# Patient Record
Sex: MALE | Race: AMERICAN INDIAN OR ALASKA NATIVE
[De-identification: names, ages, dates, MRNs, and addresses within clinical notes are randomized per-mention and may not be internally consistent; named-entity substitution may affect disease eponyms.]

---

## 2019-03-13 ENCOUNTER — HOSPITAL ENCOUNTER (INPATIENT)
Dept: HOSPITAL 5 - ED | Age: 29
LOS: 2 days | Discharge: LEFT BEFORE DISCHARGE | DRG: 605 | End: 2019-03-15
Attending: INTERNAL MEDICINE | Admitting: INTERNAL MEDICINE
Payer: SELF-PAY

## 2019-03-13 DIAGNOSIS — F17.210: ICD-10-CM

## 2019-03-13 DIAGNOSIS — F12.90: ICD-10-CM

## 2019-03-13 DIAGNOSIS — B19.20: ICD-10-CM

## 2019-03-13 DIAGNOSIS — F14.90: ICD-10-CM
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
## 2019-03-13 DIAGNOSIS — K00.7: ICD-10-CM

## 2019-03-13 DIAGNOSIS — Z72.89: ICD-10-CM

## 2019-03-13 DIAGNOSIS — S40.851A: Primary | ICD-10-CM

## 2019-03-13 DIAGNOSIS — R07.9: ICD-10-CM

## 2019-03-13 DIAGNOSIS — F19.939: ICD-10-CM

## 2019-03-13 LAB
ALBUMIN SERPL-MCNC: 3.9 G/DL (ref 3.9–5)
ALT SERPL-CCNC: 150 UNITS/L (ref 7–56)
BASOPHILS # (AUTO): 0.1 K/MM3 (ref 0–0.1)
BASOPHILS NFR BLD AUTO: 0.8 % (ref 0–1.8)
BENZODIAZEPINES SCREEN,URINE: (no result)
BILIRUB UR QL STRIP: (no result)
BLOOD UR QL VISUAL: (no result)
BUN SERPL-MCNC: 12 MG/DL (ref 9–20)
BUN/CREAT SERPL: 20 %
CALCIUM SERPL-MCNC: 9.2 MG/DL (ref 8.4–10.2)
EOSINOPHIL # BLD AUTO: 0.1 K/MM3 (ref 0–0.4)
EOSINOPHIL NFR BLD AUTO: 0.7 % (ref 0–4.3)
HCT VFR BLD CALC: 39.9 % (ref 35.5–45.6)
HEMOLYSIS INDEX: 7
HGB BLD-MCNC: 13.4 GM/DL (ref 11.8–15.2)
LYMPHOCYTES # BLD AUTO: 2.3 K/MM3 (ref 1.2–5.4)
LYMPHOCYTES NFR BLD AUTO: 27.8 % (ref 13.4–35)
MCHC RBC AUTO-ENTMCNC: 34 % (ref 32–34)
MCV RBC AUTO: 86 FL (ref 84–94)
METHADONE SCREEN,URINE: (no result)
MONOCYTES # (AUTO): 1 K/MM3 (ref 0–0.8)
MONOCYTES % (AUTO): 12.1 % (ref 0–7.3)
OPIATE SCREEN,URINE: (no result)
PH UR STRIP: 7 [PH] (ref 5–7)
PLATELET # BLD: 342 K/MM3 (ref 140–440)
PROT UR STRIP-MCNC: (no result) MG/DL
RBC # BLD AUTO: 4.62 M/MM3 (ref 3.65–5.03)
RBC #/AREA URNS HPF: 2 /HPF (ref 0–6)
UROBILINOGEN UR-MCNC: 4 MG/DL (ref ?–2)
WBC #/AREA URNS HPF: < 1 /HPF (ref 0–6)

## 2019-03-13 PROCEDURE — 90715 TDAP VACCINE 7 YRS/> IM: CPT

## 2019-03-13 PROCEDURE — 93010 ELECTROCARDIOGRAM REPORT: CPT

## 2019-03-13 PROCEDURE — 82550 ASSAY OF CK (CPK): CPT

## 2019-03-13 PROCEDURE — 93005 ELECTROCARDIOGRAM TRACING: CPT

## 2019-03-13 PROCEDURE — 87040 BLOOD CULTURE FOR BACTERIA: CPT

## 2019-03-13 PROCEDURE — 80048 BASIC METABOLIC PNL TOTAL CA: CPT

## 2019-03-13 PROCEDURE — 87116 MYCOBACTERIA CULTURE: CPT

## 2019-03-13 PROCEDURE — 36415 COLL VENOUS BLD VENIPUNCTURE: CPT

## 2019-03-13 PROCEDURE — 81001 URINALYSIS AUTO W/SCOPE: CPT

## 2019-03-13 PROCEDURE — 93306 TTE W/DOPPLER COMPLETE: CPT

## 2019-03-13 PROCEDURE — 80307 DRUG TEST PRSMV CHEM ANLYZR: CPT

## 2019-03-13 PROCEDURE — 80053 COMPREHEN METABOLIC PANEL: CPT

## 2019-03-13 PROCEDURE — 76937 US GUIDE VASCULAR ACCESS: CPT

## 2019-03-13 PROCEDURE — 82553 CREATINE MB FRACTION: CPT

## 2019-03-13 PROCEDURE — 0JJV0ZZ INSPECTION OF UPPER EXTREMITY SUBCUTANEOUS TISSUE AND FASCIA, OPEN APPROACH: ICD-10-PCS | Performed by: INTERNAL MEDICINE

## 2019-03-13 PROCEDURE — 80074 ACUTE HEPATITIS PANEL: CPT

## 2019-03-13 PROCEDURE — 87186 SC STD MICRODIL/AGAR DIL: CPT

## 2019-03-13 PROCEDURE — 99406 BEHAV CHNG SMOKING 3-10 MIN: CPT

## 2019-03-13 PROCEDURE — 82140 ASSAY OF AMMONIA: CPT

## 2019-03-13 PROCEDURE — 85025 COMPLETE CBC W/AUTO DIFF WBC: CPT

## 2019-03-13 PROCEDURE — 84484 ASSAY OF TROPONIN QUANT: CPT

## 2019-03-13 PROCEDURE — 10121 INC&RMVL FB SUBQ TISS COMP: CPT

## 2019-03-13 RX ADMIN — NICOTINE SCH MG: 21 PATCH TRANSDERMAL at 20:45

## 2019-03-13 RX ADMIN — PIPERACILLIN AND TAZOBACTAM SCH MLS/HR: 4; .5 INJECTION, POWDER, FOR SOLUTION INTRAVENOUS at 12:00

## 2019-03-13 RX ADMIN — ENOXAPARIN SODIUM SCH MG: 100 INJECTION SUBCUTANEOUS at 22:11

## 2019-03-13 RX ADMIN — IBUPROFEN PRN MG: 400 TABLET, FILM COATED ORAL at 20:45

## 2019-03-13 RX ADMIN — METHADONE HYDROCHLORIDE SCH MG: 10 TABLET ORAL at 22:11

## 2019-03-13 RX ADMIN — PIPERACILLIN AND TAZOBACTAM SCH MLS/HR: 4; .5 INJECTION, POWDER, FOR SOLUTION INTRAVENOUS at 20:45

## 2019-03-13 NOTE — EMERGENCY DEPARTMENT REPORT
ED General Adult HPI





- General


Chief complaint: Extremity Injury, Upper


Stated complaint: GENERAL PAIN, DETOX


Time Seen by Provider: 03/13/19 07:06


Source: patient, EMS


Mode of arrival: Ambulatory


Limitations: No Limitations





- History of Present Illness


Initial comments: 





Patient is a 28-year-old male that presents emergency room with complaints of 

generalized body pain and body aches and chills and right arm pain secondary to 

a needle breaking off in his right arm.  Patient states that his right arm pain 

is in the right antecubital region where he was injecting IV drugs.  Patient 

states he also desires detox.  Patient states the pain is out of 10.  Patient 

states the pain is worsening.  Patient states the pain is in his entire body.





She is also complaining of chest pain and shortness of breath.  Patient states 

the chest pain is a 10 out of 10.  Patient states the pain is worse with 

exertion and movement.  Patient states the shortness breath is worse with 

exertion and better with rest.  Patient states the chest pain is better with 

rest.  Patient states he's been recently using IV drugs again.


-: Sudden


Location: chest, back


Severity scale (0 -10): 10


Quality: stabbing


Consistency: constant


Improves with: rest


Worsens with: movement


Associated Symptoms: denies: confusion, chest pain, cough, diaphoresis, 

headaches, loss of appetite, malaise, nausea/vomiting, rash, seizure, shortness 

of breath, syncope, weakness


Treatments Prior to Arrival: none





- Related Data


                                Home Medications











 Medication  Instructions  Recorded  Confirmed  Last Taken


 


No Known Home Medications [No  03/13/19 03/13/19 Unknown





Reported Home Medications]    











                                    Allergies











Allergy/AdvReac Type Severity Reaction Status Date / Time


 


No Known Allergies Allergy   Unverified 03/13/19 07:59














ED Review of Systems


ROS: 


Stated complaint: GENERAL PAIN, DETOX


Other details as noted in HPI





Constitutional: chills.  denies: fever


Eyes: denies: eye pain, eye discharge, vision change


ENT: denies: ear pain, throat pain


Respiratory: shortness of breath.  denies: cough, wheezing


Cardiovascular: chest pain.  denies: palpitations


Endocrine: no symptoms reported


Gastrointestinal: denies: abdominal pain, nausea, diarrhea


Genitourinary: denies: urgency, dysuria


Musculoskeletal: back pain, myalgia.  denies: joint swelling, arthralgia


Skin: denies: rash, lesions


Neurological: denies: headache, weakness, paresthesias


Psychiatric: denies: anxiety, depression


Hematological/Lymphatic: denies: easy bleeding, easy bruising





ED Past Medical Hx





- Past Medical History


Previous Medical History?: Yes


Additional medical history: IV drug use





- Surgical History


Past Surgical History?: No





- Family History


Family history: no significant





- Social History


Smoking Status: Current Every Day Smoker


Substance Use Type: Alcohol, Cocaine, Heroin, Marijuana





- Medications


Home Medications: 


                                Home Medications











 Medication  Instructions  Recorded  Confirmed  Last Taken  Type


 


No Known Home Medications [No  03/13/19 03/13/19 Unknown History





Reported Home Medications]     














ED Physical Exam





- General


Limitations: No Limitations


General appearance: alert, in no apparent distress





- Head


Head exam: Present: atraumatic, normocephalic





- Eye


Eye exam: Present: normal appearance





- ENT


ENT exam: Present: mucous membranes dry





- Neck


Neck exam: Present: normal inspection





- Respiratory


Respiratory exam: Present: normal lung sounds bilaterally.  Absent: respiratory 

distress





- Cardiovascular


Cardiovascular Exam: Present: regular rate, normal rhythm.  Absent: systolic 

murmur, diastolic murmur, rubs, gallop





- GI/Abdominal


GI/Abdominal exam: Present: soft, normal bowel sounds





- Rectal


Rectal exam: Present: deferred





- Extremities Exam


Extremities exam: Present: normal inspection, tenderness (right antecubital 

region)





- Back Exam


Back exam: Present: normal inspection





- Neurological Exam


Neurological exam: Present: alert, oriented X3





- Psychiatric


Psychiatric exam: Present: normal affect, normal mood





- Skin


Skin exam: Present: warm, dry, intact, normal color.  Absent: rash





ED Course


                                   Vital Signs











  03/13/19 03/13/19 03/13/19





  04:30 04:32 04:45


 


Temperature  98.3 F 


 


Pulse Rate 98 H 99 H 92 H


 


Respiratory 19  13





Rate   


 


Blood Pressure  126/73 125/69


 


O2 Sat by Pulse   97





Oximetry   














  03/13/19 03/13/19 03/13/19





  05:00 05:15 05:30


 


Temperature   


 


Pulse Rate 96 H 93 H 94 H


 


Respiratory 18 27 H 25 H





Rate   


 


Blood Pressure 115/63 113/54 123/55


 


O2 Sat by Pulse 98 97 97





Oximetry   














  03/13/19 03/13/19 03/13/19





  05:46 06:00 06:15


 


Temperature   


 


Pulse Rate 97 H 93 H 86


 


Respiratory 13 15 25 H





Rate   


 


Blood Pressure 121/75 118/70 121/63


 


O2 Sat by Pulse 98 95 





Oximetry   














  03/13/19 03/13/19 03/13/19





  06:30 06:45 07:00


 


Temperature   


 


Pulse Rate 85 81 84


 


Respiratory 22 26 H 25 H





Rate   


 


Blood Pressure 114/58 109/64 105/63


 


O2 Sat by Pulse 96 95 98





Oximetry   














  03/13/19 03/13/19 03/13/19





  07:15 07:30 07:45


 


Temperature   


 


Pulse Rate 83 77 80


 


Respiratory 19 21 14





Rate   


 


Blood Pressure 105/63 105/63 124/66


 


O2 Sat by Pulse   





Oximetry   














  03/13/19 03/13/19 03/13/19





  08:00 08:15 08:30


 


Temperature   


 


Pulse Rate 77 83 81


 


Respiratory 17 24 27 H





Rate   


 


Blood Pressure 126/72 124/69 120/61


 


O2 Sat by Pulse   





Oximetry   














  03/13/19 03/13/19 03/13/19





  08:45 09:00 09:15


 


Temperature   


 


Pulse Rate 77 102 H 54 L


 


Respiratory 22 20 19





Rate   


 


Blood Pressure 125/65 123/75 129/52


 


O2 Sat by Pulse   





Oximetry   














  03/13/19 03/13/19 03/13/19





  09:30 09:46 10:00


 


Temperature   


 


Pulse Rate 67 89 74


 


Respiratory 19 18 21





Rate   


 


Blood Pressure 123/62 123/62 109/56


 


O2 Sat by Pulse   





Oximetry   














  03/13/19 03/13/19 03/13/19





  10:15 10:30 10:45


 


Temperature   


 


Pulse Rate 69 104 H 70


 


Respiratory 26 H 21 25 H





Rate   


 


Blood Pressure 102/53 115/61 114/60


 


O2 Sat by Pulse   





Oximetry   














  03/13/19 03/13/19 03/13/19





  11:00 11:10 11:15


 


Temperature   


 


Pulse Rate 77  75


 


Respiratory 15 18 24





Rate   


 


Blood Pressure 118/63  116/66


 


O2 Sat by Pulse   





Oximetry   














  03/13/19 03/13/19 03/13/19





  11:30 11:45 12:00


 


Temperature   


 


Pulse Rate 74 75 63


 


Respiratory 24 25 H 24





Rate   


 


Blood Pressure 106/57 111/62 111/55


 


O2 Sat by Pulse   





Oximetry   














  03/13/19 03/13/19 03/13/19





  12:16 12:30 12:46


 


Temperature   


 


Pulse Rate 51 L 92 H 88


 


Respiratory 22 24 21





Rate   


 


Blood Pressure 116/48 111/65 124/59


 


O2 Sat by Pulse   





Oximetry   














  03/13/19





  13:00


 


Temperature 


 


Pulse Rate 59 L


 


Respiratory 22





Rate 


 


Blood Pressure 103/56


 


O2 Sat by Pulse 





Oximetry 














- Reevaluation(s)


Reevaluation #1: 


Made at trying to retrieve right antecubital forearm body.  Needle visualized on

x-ray.  See procedure note


03/13/19 09:56








GUSTO results and plan of care with patient.  Patient agrees with plan of care 

and admission.  Patient was admitted to the hospitalist service.


03/13/19 10:15








- Consultations


Consultation #1: 


Discussed case with general surgery, Dr Tim.  Dr Tim states to just leave 

the needle in place there is no further interventions needed.  Dr Tim states 

that wound care can be consulted for management.


03/13/19 10:07





Consultation #2: 


Hospitalist consulted for admission.  Hospitalist to admit patient.  Hospitalist

to assume care patient.  Bridge orders placed for hospitalist


03/13/19 10:14








- Procedure Description


Procedures done: Right antecubital form body retrieval.  Attempt made.  Unable 

to extract metallic foreign body.  A 1 cm incision made.  Prior to incision site

was cleaned and draped in a sterile fashion and 3 mL of lidocaine injected 

superficially.  Sterile dressing applied.  Bleeding controlled with direct 

pressure and dressing.





ED Medical Decision Making





- Lab Data


Result diagrams: 


                                 03/13/19 07:56





                                 03/13/19 07:56





- EKG Data


-: EKG Interpreted by Me


EKG shows normal: sinus rhythm, axis, intervals, QRS complexes, ST-T waves


Rate: normal





- Radiology Data


Radiology results: report reviewed





- Medical Decision Making





Patient is a 28-year-old mellitus emergency room with multiple complaints.  

Patient complained of body aches, chills and chest pain shortness of breath.  

The patient also complained of pain in his antecubital space on his right arm.  

Patient states he had a needle break off into his right arm.  Patient had an x-

ray which showed a needle in the right antecubital space.  Informed body removal

procedure was attempted and was unsuccessful.  Patient was admitted to the 

hospital service for further evaluation treatment.  General surgery consulted.  

General surgery recommendations R patient have wound care done in the hospital 

and no further evaluation of the needle.  Patient given a tetanus in the ER.





- Differential Diagnosis


cp. sob. foreign body. infection. endo. 


Critical Care Time: Yes


Critical care attestation.: 


If time is entered above; I have spent that time in minutes in the direct care 

of this critically ill patient, excluding procedure time.





Critical Care Time: 





45 minutes





ED Disposition


Clinical Impression: 


 Chills, Body aches, SOB (shortness of breath)





Foreign body of upper arm, superficial


Qualifiers:


 Encounter type: initial encounter Laterality: right Qualified Code(s): S40.851A

- Superficial foreign body of right upper arm, initial encounter





Chest pain


Qualifiers:


 Chest pain type: unspecified Qualified Code(s): R07.9 - Chest pain, unspecified





Disposition: DC-09 OP ADMIT IP TO THIS HOSP


Is pt being admited?: Yes


Does the pt Need Aspirin: No


Condition: Critical


Time of Disposition: 10:15

## 2019-03-13 NOTE — CAT SCAN REPORT
CT UPPER EXTREMITY RIGHT WITH CONTRAST



History: Pain, foreign body, needle in the antecubital fossa



Technique: Helical CT following IV contrast. Sagittal and coronal 

reformatted images.



Findings: Right elbow films performed the same day were reviewed. CT 

confirms a linear metallic foreign body measuring approximately 7 mm in 

the subcutaneous tissues of the right antecubital fossa. This appears 

to represent a needle fragment. The tip of the foreign body is located 

approximately 2 mm deep to the skin surface. Please correlate with the 

images. There is no evidence for soft tissue gas or associated abscess 

in the antecubital fossa.



The remaining soft tissues and bony structures are unremarkable. No 

joint pathology is identified.



IMPRESSION: Foreign body in the right antecubital fossa soft tissues as 

outlined above. No associated abscess.

## 2019-03-13 NOTE — HISTORY AND PHYSICAL REPORT
History of Present Illness


Date of examination: 03/13/19


Chief complaint: 





Generalized body aches


History of present illness: 


28-year-old -American male with medical history significant for heroin 

abuse, nicotine dependence presented to the emergency department his complaints 

of generalized aching pain for the last 3 days.  He rated the pain 9 out of 10 

intensity, pronounce it in his joints and bone associated with subjective fever 

and chills.  Patient said his needle was broken and left in his right 

antecubital area 3 days ago while he was using heroine.  He cannot move his 

right elbow because of the pain due to the needle.  The patient denied discharge

from the site.  Patient said he is withdrawing from heroin.  Because of his IV 

drug abuse, fever and chills I want to rule out infective endocarditis.





REVIEW OF SYSTEMS:


GENERAL: no weight change, no fatigue


HEAD: no head ache


EYES: no blurry vision, no acute visual loss


EARS: no hearing loss, no discharge, no earache


NOSE: no stuffiness, no sneezing, no discharge


MOUTH, THROAT AND NECK: no bleeding gums, no sore throat, no swollen neck


CARDIAC: no palpitations, no dyspnea on exertion, no orthopnea, no PND, no 

edema, no chest pain


RESPIRATORY: no shortness of breath, no wheeze, no cough, no sputum, no 

hemoptysis, no asthma


GI: no decreased appetite, no nausea, no vomiting, no dysphagia, no diarrhea, no

constipation, no 


abdominal pain


URINARY: no change in frequency, no urgency, no polyuria, no hematuria, no 

incontinence


MUSCULOSKELETAL: no muscle weakness, no joint stiffness


NEUROLOGIC: no loss of sensation/numbness, no tingling, no tremors, no 

weakness/paralysis


HEMATOLOGIC: no anemia, no easy bruising


SKIN: no rashes


ENDOCRINE: no heat/cold intolerance, no polyuria, no polydipsia, no thyroid 

problems, no diabetes


PSYCHIATRIC: no anxiety, no depression, no suicidal ideations














Past History


Past Medical History: No medical history


Past Surgical History: No surgical history


Social history: smoking (smoking a lot of cigarettes, and marijuana), IV drug 

use (heroine).  denies: alcohol abuse


Family history: no significant family history





Medications and Allergies


                                    Allergies











Allergy/AdvReac Type Severity Reaction Status Date / Time


 


No Known Allergies Allergy   Unverified 03/13/19 07:59











                                Home Medications











 Medication  Instructions  Recorded  Confirmed  Last Taken  Type


 


No Known Home Medications [No  03/13/19 03/13/19 Unknown History





Reported Home Medications]     











Active Meds: 


Active Medications





Enoxaparin Sodium (Lovenox)  40 mg SUB-Q QDAY@2200 [facility]


Vancomycin HCl (Vancomycin/Ns 1 Gm/250 Ml)  1 gm in 250 mls @ 166.667 mls/hr IV 

Q12H [facility]; Protocol


Piperacillin Sod/Tazobactam Sod (Zosyn/Ns 4.5gm/100ml)  4.5 gm in 100 mls @ 200 

mls/hr IV Q6HR [facility]; Protocol


Ibuprofen (Motrin)  400 mg PO TID PRN


   PRN Reason: Pain , Severe (7-10)


Methadone HCl (Dolophine)  20 mg PO ONCE ONE


   Stop: 03/13/19 10:44











Exam





- Physical Exam


Narrative exam: 


 Not in cardiopulmonary distress. 


 The patient appeared well nourished and normally developed.


 Vital signs as documented.


 Head exam is unremarkable.


 No scleral icterus .


 Neck is without jugular venous distension, thyromegaly, or carotid bruits. 


 Lungs are clear to auscultation.


Cardiac exam reveals regular rate and  Rhythm.  


Abdominal exam reveals normal bowel sounds. 


Extremities are nonedematous and both femoral and pedal pulses are normal.


CNS: Alert and oriented 3.  No focal weakness.





- Constitutional


Vitals: 


                                        











Temp Pulse Resp BP Pulse Ox


 


 98.3 F   70   25 H  114/60   98 


 


 03/13/19 04:32  03/13/19 10:45  03/13/19 10:45  03/13/19 10:45  03/13/19 07:00














Results





- Labs


CBC & Chem 7: 


                                 03/13/19 07:56





                                 03/13/19 07:56


Labs: 


                             Laboratory Last Values











WBC  8.1 K/mm3 (4.5-11.0)   03/13/19  07:56    


 


RBC  4.62 M/mm3 (3.65-5.03)   03/13/19  07:56    


 


Hgb  13.4 gm/dl (11.8-15.2)   03/13/19  07:56    


 


Hct  39.9 % (35.5-45.6)   03/13/19  07:56    


 


MCV  86 fl (84-94)   03/13/19  07:56    


 


MCH  29 pg (28-32)   03/13/19  07:56    


 


MCHC  34 % (32-34)   03/13/19  07:56    


 


RDW  14.9 % (13.2-15.2)   03/13/19  07:56    


 


Plt Count  342 K/mm3 (140-440)   03/13/19  07:56    


 


Lymph % (Auto)  27.8 % (13.4-35.0)   03/13/19  07:56    


 


Mono % (Auto)  12.1 % (0.0-7.3)  H  03/13/19  07:56    


 


Eos % (Auto)  0.7 % (0.0-4.3)   03/13/19  07:56    


 


Baso % (Auto)  0.8 % (0.0-1.8)   03/13/19  07:56    


 


Lymph #  2.3 K/mm3 (1.2-5.4)   03/13/19  07:56    


 


Mono #  1.0 K/mm3 (0.0-0.8)  H  03/13/19  07:56    


 


Eos #  0.1 K/mm3 (0.0-0.4)   03/13/19  07:56    


 


Baso #  0.1 K/mm3 (0.0-0.1)   03/13/19  07:56    


 


Seg Neutrophils %  58.6 % (40.0-70.0)   03/13/19  07:56    


 


Seg Neutrophils #  4.8 K/mm3 (1.8-7.7)   03/13/19  07:56    


 


Sodium  138 mmol/L (137-145)   03/13/19  07:56    


 


Potassium  4.5 mmol/L (3.6-5.0)   03/13/19  07:56    


 


Chloride  102.8 mmol/L ()   03/13/19  07:56    


 


Carbon Dioxide  25 mmol/L (22-30)   03/13/19  07:56    


 


Anion Gap  15 mmol/L  03/13/19  07:56    


 


BUN  12 mg/dL (9-20)   03/13/19  07:56    


 


Creatinine  0.6 mg/dL (0.8-1.5)  L  03/13/19  07:56    


 


Estimated GFR  > 60 ml/min  03/13/19  07:56    


 


BUN/Creatinine Ratio  20 %  03/13/19  07:56    


 


Glucose  98 mg/dL ()   03/13/19  07:56    


 


Lactic Acid  1.00 mmol/L (0.7-2.0)   03/13/19  07:56    


 


Calcium  9.2 mg/dL (8.4-10.2)   03/13/19  07:56    


 


Total Bilirubin  0.30 mg/dL (0.1-1.2)   03/13/19  07:56    


 


AST  77 units/L (5-40)  H  03/13/19  07:56    


 


ALT  150 units/L (7-56)  H  03/13/19  07:56    


 


Alkaline Phosphatase  75 units/L ()   03/13/19  07:56    


 


Total Creatine Kinase  160 units/L ()   03/13/19  07:56    


 


CK-MB (CK-2)  1.7 ng/mL (0.0-4.0)   03/13/19  07:56    


 


CK-MB (CK-2) Rel Index  1.0  (0-4)   03/13/19  07:56    


 


Troponin T  < 0.010 ng/mL (0.00-0.029)   03/13/19  07:56    


 


Total Protein  6.7 g/dL (6.3-8.2)   03/13/19  07:56    


 


Albumin  3.9 g/dL (3.9-5)   03/13/19  07:56    


 


Albumin/Globulin Ratio  1.4 %  03/13/19  07:56    


 


Urine Color  Yellow  (Yellow)   03/13/19  08:00    


 


Urine Turbidity  Clear  (Clear)   03/13/19  08:00    


 


Urine pH  7.0  (5.0-7.0)   03/13/19  08:00    


 


Ur Specific Gravity  1.013  (1.003-1.030)   03/13/19  08:00    


 


Urine Protein  <15 mg/dl mg/dL (Negative)   03/13/19  08:00    


 


Urine Glucose (UA)  Neg mg/dL (Negative)   03/13/19  08:00    


 


Urine Ketones  Neg mg/dL (Negative)   03/13/19  08:00    


 


Urine Blood  Neg  (Negative)   03/13/19  08:00    


 


Urine Nitrite  Neg  (Negative)   03/13/19  08:00    


 


Urine Bilirubin  Neg  (Negative)   03/13/19  08:00    


 


Urine Urobilinogen  4.0 mg/dL (<2.0)   03/13/19  08:00    


 


Ur Leukocyte Esterase  Neg  (Negative)   03/13/19  08:00    


 


Urine WBC (Auto)  < 1.0 /HPF (0.0-6.0)   03/13/19  08:00    


 


Urine RBC (Auto)  2.0 /HPF (0.0-6.0)   03/13/19  08:00    


 


Urine Opiates Screen  Presumptive negative   03/13/19  08:00    


 


Urine Methadone Screen  Presumptive negative   03/13/19  08:00    


 


Ur Barbiturates Screen  Presumptive negative   03/13/19  08:00    


 


Ur Phencyclidine Scrn  Presumptive negative   03/13/19  08:00    


 


Ur Amphetamines Screen  Presumptive negative   03/13/19  08:00    


 


U Benzodiazepines Scrn  Presumptive negative   03/13/19  08:00    


 


Urine Cocaine Screen  Presumptive negative   03/13/19  08:00    


 


U Marijuana (THC) Screen  Presumptive positive   03/13/19  08:00    


 


Drugs of Abuse Note  Disclamer   03/13/19  08:00    














Assessment and Plan


Assessment and plan: 





Rule out infective endocarditis


- Patient is on IV vancomycin and Zosyn


- Blood culture ordered


- Echo, we'll monitor for fever


- ID consult


Broken needle in the right antecubital area


- IR was consulted and will do CT and will remove the needle


Heroin abuse, withdrawal


- On methadone


Nicotine dependence


-Nicotine patch


DVT prophylaxis


- On Lovenox


Disposition


- Admit to telemetry floor


Advance Directives: Yes


VTE prophylaxis?: Chemical


Plan of care discussed with patient/family: Yes

## 2019-03-13 NOTE — XRAY REPORT
RIGHT ELBOW, 2 VIEWS



History: Pain, possible foreign body.



Findings: The bony structures and joint space are unremarkable. There 

is a 7 mm linear density in the anterior soft tissues on the lateral 

image which could represent a foreign body. This may represent a small 

needle or other linear metallic foreign body. Please correlate with the 

image and the patient.



Impression:

Possible soft tissue foreign body in the antecubital fossa. Please 

correlate with the images and the patient.

## 2019-03-13 NOTE — CONSULTATION
History of Present Illness





- Reason for Consult


Consult date: 03/13/19


foreign body in antecubital fossa





- History of Present Illness





Patient with a history of IV drug abuse who fractured and needle in his 

antecubital fossa all injecting drugs.  The patient presents with fevers and 

chills.  There is no drainage or exudates at the site of needle insertion.  

Attempt removed and foreign body in the emergency department were unsuccessful.





Past History


Past Medical History: other (IV drug abuse)


Social history: no significant social history


Family history: no significant family history





Medications and Allergies


                                    Allergies











Allergy/AdvReac Type Severity Reaction Status Date / Time


 


No Known Allergies Allergy   Unverified 03/13/19 07:59











                                Home Medications











 Medication  Instructions  Recorded  Confirmed  Last Taken  Type


 


No Known Home Medications [No  03/13/19 03/13/19 Unknown History





Reported Home Medications]     











Active Meds: 


Active Medications





Enoxaparin Sodium (Lovenox)  40 mg SUB-Q QDAY@2200 [facility]


Vancomycin HCl (Vancomycin/Ns 1 Gm/250 Ml)  1 gm in 250 mls @ 166.667 mls/hr IV 

Q12H [facility]; Protocol


Piperacillin Sod/Tazobactam Sod (Zosyn/Ns 4.5gm/100ml)  4.5 gm in 100 mls @ 200 

mls/hr IV Q6HR [facility]; Protocol


Vancomycin HCl 1,500 mg/ (Sodium Chloride)  530 mls @ 333.333 mls/hr IV ONCE ONE


   Stop: 03/13/19 13:35


Ibuprofen (Motrin)  400 mg PO TID PRN


   PRN Reason: Pain , Severe (7-10)


Ondansetron HCl (Zofran)  4 mg IV Q8H PRN


   PRN Reason: N/V unrelieved by Reglan











Review of Systems


All systems: negative





Exam





- Constitutional


Vitals: 


                                        











Temp Pulse Resp BP Pulse Ox


 


 98.3 F   70   18   114/60   98 


 


 03/13/19 04:32  03/13/19 10:45  03/13/19 11:10  03/13/19 10:45  03/13/19 07:00











General appearance: Present: no acute distress





- EENT


Eyes: Present: EOM intact


ENT: hearing intact





- Neck


Neck: Present: supple





- Extremities


Extremities: no ischemia





- Abdominal


General gastrointestinal: Present: deferred


Male genitourinary: Present: deferred





- Rectal


Rectal Exam: deferred





- Psychiatric


Psychiatric: cooperative





- Neurologic


Neurologic: no focal deficits





Results





- Labs


CBC & Chem 7: 


                                 03/13/19 07:56





                                 03/13/19 07:56


Labs: 


                              Abnormal lab results











  03/13/19 03/13/19 Range/Units





  07:56 07:56 


 


Mono % (Auto)  12.1 H   (0.0-7.3)  %


 


Mono #  1.0 H   (0.0-0.8)  K/mm3


 


Creatinine   0.6 L  (0.8-1.5)  mg/dL


 


AST   77 H  (5-40)  units/L


 


ALT   150 H  (7-56)  units/L














- Imaging and Cardiology


Chest x-ray: image reviewed (right elbow xray)





Assessment and Plan





We'll order a CT scan of the patient's right arm to determine the depth of the 

foreign body.  Following that, the patient will undergo removal of foreign body 

under likely ultrasound guidance.

## 2019-03-14 LAB
BASOPHILS # (AUTO): 0 K/MM3 (ref 0–0.1)
BASOPHILS NFR BLD AUTO: 0.5 % (ref 0–1.8)
BUN SERPL-MCNC: 11 MG/DL (ref 9–20)
BUN/CREAT SERPL: 16 %
CALCIUM SERPL-MCNC: 9.2 MG/DL (ref 8.4–10.2)
EOSINOPHIL # BLD AUTO: 0.1 K/MM3 (ref 0–0.4)
EOSINOPHIL NFR BLD AUTO: 1.4 % (ref 0–4.3)
HCT VFR BLD CALC: 43.3 % (ref 35.5–45.6)
HEMOLYSIS INDEX: 15
HGB BLD-MCNC: 14.4 GM/DL (ref 11.8–15.2)
LYMPHOCYTES # BLD AUTO: 2.4 K/MM3 (ref 1.2–5.4)
LYMPHOCYTES NFR BLD AUTO: 30.2 % (ref 13.4–35)
MCHC RBC AUTO-ENTMCNC: 33 % (ref 32–34)
MCV RBC AUTO: 87 FL (ref 84–94)
MONOCYTES # (AUTO): 0.9 K/MM3 (ref 0–0.8)
MONOCYTES % (AUTO): 11.1 % (ref 0–7.3)
PLATELET # BLD: 364 K/MM3 (ref 140–440)
RBC # BLD AUTO: 4.95 M/MM3 (ref 3.65–5.03)

## 2019-03-14 PROCEDURE — BW1J1ZZ FLUOROSCOPY OF UPPER EXTREMITY USING LOW OSMOLAR CONTRAST: ICD-10-PCS | Performed by: RADIOLOGY

## 2019-03-14 PROCEDURE — 0JCG0ZZ EXTIRPATION OF MATTER FROM RIGHT LOWER ARM SUBCUTANEOUS TISSUE AND FASCIA, OPEN APPROACH: ICD-10-PCS | Performed by: RADIOLOGY

## 2019-03-14 PROCEDURE — BH47ZZZ ULTRASONOGRAPHY OF UPPER EXTREMITY: ICD-10-PCS | Performed by: RADIOLOGY

## 2019-03-14 RX ADMIN — PIPERACILLIN AND TAZOBACTAM SCH MLS/HR: 4; .5 INJECTION, POWDER, FOR SOLUTION INTRAVENOUS at 17:20

## 2019-03-14 RX ADMIN — PIPERACILLIN AND TAZOBACTAM SCH MLS/HR: 4; .5 INJECTION, POWDER, FOR SOLUTION INTRAVENOUS at 23:09

## 2019-03-14 RX ADMIN — MIDAZOLAM ONE MG: 1 INJECTION INTRAMUSCULAR; INTRAVENOUS at 14:24

## 2019-03-14 RX ADMIN — IBUPROFEN PRN MG: 400 TABLET, FILM COATED ORAL at 21:14

## 2019-03-14 RX ADMIN — PIPERACILLIN AND TAZOBACTAM SCH MLS/HR: 4; .5 INJECTION, POWDER, FOR SOLUTION INTRAVENOUS at 06:08

## 2019-03-14 RX ADMIN — VANCOMYCIN HYDROCHLORIDE SCH MLS/HR: 1 INJECTION, POWDER, LYOPHILIZED, FOR SOLUTION INTRAVENOUS at 17:19

## 2019-03-14 RX ADMIN — VANCOMYCIN HYDROCHLORIDE SCH MLS/HR: 1 INJECTION, POWDER, LYOPHILIZED, FOR SOLUTION INTRAVENOUS at 22:49

## 2019-03-14 RX ADMIN — METHADONE HYDROCHLORIDE SCH MG: 10 TABLET ORAL at 09:22

## 2019-03-14 RX ADMIN — PIPERACILLIN AND TAZOBACTAM SCH MLS/HR: 4; .5 INJECTION, POWDER, FOR SOLUTION INTRAVENOUS at 00:04

## 2019-03-14 RX ADMIN — FENTANYL CITRATE ONE MCG: 50 INJECTION, SOLUTION INTRAMUSCULAR; INTRAVENOUS at 14:13

## 2019-03-14 RX ADMIN — METHADONE HYDROCHLORIDE SCH MG: 10 TABLET ORAL at 21:13

## 2019-03-14 RX ADMIN — IBUPROFEN PRN MG: 400 TABLET, FILM COATED ORAL at 17:18

## 2019-03-14 RX ADMIN — PIPERACILLIN AND TAZOBACTAM SCH: 4; .5 INJECTION, POWDER, FOR SOLUTION INTRAVENOUS at 17:19

## 2019-03-14 RX ADMIN — MIDAZOLAM ONE MG: 1 INJECTION INTRAMUSCULAR; INTRAVENOUS at 14:13

## 2019-03-14 RX ADMIN — FENTANYL CITRATE ONE MCG: 50 INJECTION, SOLUTION INTRAMUSCULAR; INTRAVENOUS at 14:24

## 2019-03-14 RX ADMIN — NICOTINE SCH MG: 21 PATCH TRANSDERMAL at 09:22

## 2019-03-14 RX ADMIN — ENOXAPARIN SODIUM SCH MG: 100 INJECTION SUBCUTANEOUS at 21:13

## 2019-03-14 NOTE — EVENT NOTE
Date: 03/14/19


Successful removal of the right arm foreign body. Sent for culture.





Change iodoform gauze daily until healed. 


Wound care for further management.

## 2019-03-14 NOTE — PROGRESS NOTE
Assessment and Plan


Assessment and plan: 





Rule out infective endocarditis


- Patient is on IV vancomycin and Zosyn


- follow blood culture


- follow echo


- ID consulted and recommend vancomycin until work up is finished


Broken needle in the right antecubital area


- IR was consulted and remove the foreign body


Heroin abuse, withdrawal


- On methadone


Nicotine dependence


-Nicotine patch


DVT prophylaxis


- On Lovenox


Disposition


- Admit to telemetry floor





History


Interval history: 





Patient was seen and evaluated this morning, patient denied chest pain, heroin 

withdrawal was getting better after methadone.





Hospitalist Physical





- Physical exam


Narrative exam: 


 Not in cardiopulmonary distress. 


 The patient appeared well nourished and normally developed.


 Vital signs as documented.


 Head exam is unremarkable.


 No scleral icterus .


 Neck is without jugular venous distension, thyromegaly, or carotid bruits. 


 Lungs are clear to auscultation.


Cardiac exam reveals regular rate and  Rhythm.  


Abdominal exam reveals normal bowel sounds. 


Extremities are nonedematous and both femoral and pedal pulses are normal.


CNS: Alert and oriented 3.  No focal weakness.





- Constitutional


Vitals: 


                                        











Temp Pulse Resp BP Pulse Ox


 


 98.9 F   60   20   103/51   98 


 


 03/14/19 08:07  03/14/19 08:07  03/14/19 08:07  03/14/19 08:07  03/14/19 08:07











General appearance: Present: no acute distress





Results





- Labs


CBC & Chem 7: 


                                 03/14/19 05:55





                                 03/14/19 05:55


Labs: 


                             Laboratory Last Values











WBC  8.0 K/mm3 (4.5-11.0)   03/14/19  05:55    


 


RBC  4.95 M/mm3 (3.65-5.03)   03/14/19  05:55    


 


Hgb  14.4 gm/dl (11.8-15.2)   03/14/19  05:55    


 


Hct  43.3 % (35.5-45.6)   03/14/19  05:55    


 


MCV  87 fl (84-94)   03/14/19  05:55    


 


MCH  29 pg (28-32)   03/14/19  05:55    


 


MCHC  33 % (32-34)   03/14/19  05:55    


 


RDW  15.1 % (13.2-15.2)   03/14/19  05:55    


 


Plt Count  364 K/mm3 (140-440)   03/14/19  05:55    


 


Lymph % (Auto)  30.2 % (13.4-35.0)   03/14/19  05:55    


 


Mono % (Auto)  11.1 % (0.0-7.3)  H  03/14/19  05:55    


 


Eos % (Auto)  1.4 % (0.0-4.3)   03/14/19  05:55    


 


Baso % (Auto)  0.5 % (0.0-1.8)   03/14/19  05:55    


 


Lymph #  2.4 K/mm3 (1.2-5.4)   03/14/19  05:55    


 


Mono #  0.9 K/mm3 (0.0-0.8)  H  03/14/19  05:55    


 


Eos #  0.1 K/mm3 (0.0-0.4)   03/14/19  05:55    


 


Baso #  0.0 K/mm3 (0.0-0.1)   03/14/19  05:55    


 


Seg Neutrophils %  56.8 % (40.0-70.0)   03/14/19  05:55    


 


Seg Neutrophils #  4.6 K/mm3 (1.8-7.7)   03/14/19  05:55    


 


Sodium  141 mmol/L (137-145)   03/14/19  05:55    


 


Potassium  4.3 mmol/L (3.6-5.0)   03/14/19  05:55    


 


Chloride  103.8 mmol/L ()   03/14/19  05:55    


 


Carbon Dioxide  23 mmol/L (22-30)   03/14/19  05:55    


 


Anion Gap  19 mmol/L  03/14/19  05:55    


 


BUN  11 mg/dL (9-20)   03/14/19  05:55    


 


Creatinine  0.7 mg/dL (0.8-1.5)  L  03/14/19  05:55    


 


Estimated GFR  > 60 ml/min  03/14/19  05:55    


 


BUN/Creatinine Ratio  16 %  03/14/19  05:55    


 


Glucose  84 mg/dL ()   03/14/19  05:55    


 


Lactic Acid  1.00 mmol/L (0.7-2.0)   03/13/19  07:56    


 


Calcium  9.2 mg/dL (8.4-10.2)   03/14/19  05:55    


 


Total Bilirubin  0.30 mg/dL (0.1-1.2)   03/13/19  07:56    


 


AST  77 units/L (5-40)  H  03/13/19  07:56    


 


ALT  150 units/L (7-56)  H  03/13/19  07:56    


 


Alkaline Phosphatase  75 units/L ()   03/13/19  07:56    


 


Total Creatine Kinase  160 units/L ()   03/13/19  07:56    


 


CK-MB (CK-2)  1.7 ng/mL (0.0-4.0)   03/13/19  07:56    


 


CK-MB (CK-2) Rel Index  1.0  (0-4)   03/13/19  07:56    


 


Troponin T  < 0.010 ng/mL (0.00-0.029)   03/13/19  07:56    


 


Total Protein  6.7 g/dL (6.3-8.2)   03/13/19  07:56    


 


Albumin  3.9 g/dL (3.9-5)   03/13/19  07:56    


 


Albumin/Globulin Ratio  1.4 %  03/13/19  07:56    


 


Urine Color  Yellow  (Yellow)   03/13/19  08:00    


 


Urine Turbidity  Clear  (Clear)   03/13/19  08:00    


 


Urine pH  7.0  (5.0-7.0)   03/13/19  08:00    


 


Ur Specific Gravity  1.013  (1.003-1.030)   03/13/19  08:00    


 


Urine Protein  <15 mg/dl mg/dL (Negative)   03/13/19  08:00    


 


Urine Glucose (UA)  Neg mg/dL (Negative)   03/13/19  08:00    


 


Urine Ketones  Neg mg/dL (Negative)   03/13/19  08:00    


 


Urine Blood  Neg  (Negative)   03/13/19  08:00    


 


Urine Nitrite  Neg  (Negative)   03/13/19  08:00    


 


Urine Bilirubin  Neg  (Negative)   03/13/19  08:00    


 


Urine Urobilinogen  4.0 mg/dL (<2.0)   03/13/19  08:00    


 


Ur Leukocyte Esterase  Neg  (Negative)   03/13/19  08:00    


 


Urine WBC (Auto)  < 1.0 /HPF (0.0-6.0)   03/13/19  08:00    


 


Urine RBC (Auto)  2.0 /HPF (0.0-6.0)   03/13/19  08:00    


 


Urine Opiates Screen  Presumptive negative   03/13/19  08:00    


 


Urine Methadone Screen  Presumptive negative   03/13/19  08:00    


 


Ur Barbiturates Screen  Presumptive negative   03/13/19  08:00    


 


Ur Phencyclidine Scrn  Presumptive negative   03/13/19  08:00    


 


Ur Amphetamines Screen  Presumptive negative   03/13/19  08:00    


 


U Benzodiazepines Scrn  Presumptive negative   03/13/19  08:00    


 


Urine Cocaine Screen  Presumptive negative   03/13/19  08:00    


 


U Marijuana (THC) Screen  Presumptive positive   03/13/19  08:00    


 


Drugs of Abuse Note  Disclamer   03/13/19  08:00    


 


Hepatitis A IgM Ab  Non-reactive  (NonReactive)   03/13/19  17:35    


 


Hep B Core IgM Ab  Non-reactive  (NonReactive)   03/13/19  17:35    


 


Hepatitis C Antibody  Reactive  (NonReactive)  A  03/13/19  17:35

## 2019-03-14 NOTE — POST OPERATIVE NOTE
Date of procedure: 03/14/19


Pre-op diagnosis: Foreign body in right arm


Post-op diagnosis: same


Procedure: 


1. Ultrasound guided evaluation of the right antecubital fossa with attempt at 

removal


2. Fluoroscopic guided removal of the right arm antecubital fossa retained 

needle


Anesthesia: local (w/ conscious sedation)


Surgeon: BRIANA EDGAR


Estimated blood loss: minimal


Condition: stable


Disposition: floor

## 2019-03-14 NOTE — EVENT NOTE
Date: 03/14/19





Patient off the floor. ID consulted for IVDU, concern for possible endocarditis.

Chart reviewed. Patient with retained broken needle from active IVDU. Continue 

vancomycin. Follow up blood cultures, they are negative thus far. Full consult 

to follow in AM.

## 2019-03-15 VITALS — DIASTOLIC BLOOD PRESSURE: 53 MMHG | SYSTOLIC BLOOD PRESSURE: 102 MMHG

## 2019-03-15 RX ADMIN — METHADONE HYDROCHLORIDE SCH MG: 10 TABLET ORAL at 10:46

## 2019-03-15 RX ADMIN — PIPERACILLIN AND TAZOBACTAM SCH MLS/HR: 4; .5 INJECTION, POWDER, FOR SOLUTION INTRAVENOUS at 06:15

## 2019-03-15 RX ADMIN — VANCOMYCIN HYDROCHLORIDE SCH MLS/HR: 1 INJECTION, POWDER, LYOPHILIZED, FOR SOLUTION INTRAVENOUS at 06:16

## 2019-03-15 RX ADMIN — IBUPROFEN PRN MG: 400 TABLET, FILM COATED ORAL at 14:23

## 2019-03-15 RX ADMIN — PIPERACILLIN AND TAZOBACTAM SCH MLS/HR: 4; .5 INJECTION, POWDER, FOR SOLUTION INTRAVENOUS at 12:36

## 2019-03-15 RX ADMIN — PIPERACILLIN AND TAZOBACTAM SCH MLS/HR: 4; .5 INJECTION, POWDER, FOR SOLUTION INTRAVENOUS at 18:01

## 2019-03-15 RX ADMIN — NICOTINE SCH MG: 21 PATCH TRANSDERMAL at 10:47

## 2019-03-15 RX ADMIN — VANCOMYCIN HYDROCHLORIDE SCH MLS/HR: 1 INJECTION, POWDER, LYOPHILIZED, FOR SOLUTION INTRAVENOUS at 14:24

## 2019-03-15 NOTE — CONSULTATION
History of Present Illness





- Reason for Consult


Consult date: 03/15/19


Fever, chills, concern for endocarditis


Requesting physician: SADIA GARCIA





- History of Present Illness


The patient is a 28-year-old male with active IV drug use, tobacco dependence 

presented to the emergency room on 03/19/2019 with complaints of a broken needle

getting stuck in his right elbow. He also reportedly had been having some chills

and sweats for the past week or so prior to admission. The emergency room 

physician tried and was unable to remove the foreign body and hence patient was 

admitted to the hospital. He was started empirically on IV Zosyn and vancomycin.

He was seen by interventional radiology and on 03/14/2019 he underwent 

successful removal of the foreign body under ultrasound guidance. Infectious 

diseases was consulted to evaluate for possibility of endocarditis. 





Patient currently denies any fever. Has remained afebrile throughout 

hospitalization. He reports having sweats going on for the last 1-2 weeks or so.

Continues to engage in active IV drug use. He otherwise denies nausea, vomiting.

Denies any loose watery stools. Denies urinary burning. His hepatitis C test has

resulted positive here, unaware of the diagnosis. He states he was checked for 

HIV about 2 months ago and was negative. He is sexually active with females, 

does not use condoms consistently.





Review of Systems: 


General: no fevers or rigors. Sweats and chills +


HEENT: no new visual disturbance


Respiratory: No cough, sputum, hemoptysis or shortness of breath


Cardiovascular: No chest pain, syncope


Gastrointestinal: No nausea, vomiting or diarrhea


Genitourinary: No dysuria or hematuria


Musculoskeletal: No new or worsening neck pain or back pain 


Neurologic: No headaches, seizures


Hematologic: No easy bruising or bleeding


Endocrine: No heat/cold intolerance, no acute weight loss


Skin: negative for rash, jaundice


Psychiatric: No suicidal or homicidal ideation








Past History


Past Medical History: No medical history


Past Surgical History: No surgical history


Social history: smoking (smoking a lot of cigarettes, and marijuana), IV drug 

use (heroine).  denies: alcohol abuse


Family history: no significant family history





Medications and Allergies


                                    Allergies











Allergy/AdvReac Type Severity Reaction Status Date / Time


 


No Known Allergies Allergy   Unverified 03/13/19 07:59











                                Home Medications











 Medication  Instructions  Recorded  Confirmed  Last Taken  Type


 


No Known Home Medications [No  03/13/19 03/13/19 Unknown History





Reported Home Medications]     











Active Meds: 


Active Medications





Enoxaparin Sodium (Lovenox)  40 mg SUB-Q QDAY@2200 [facility]


   Last Admin: 03/14/19 21:13 Dose:  40 mg


   Documented by: 


Piperacillin Sod/Tazobactam Sod (Zosyn/Ns 4.5gm/100ml)  4.5 gm in 100 mls @ 200 

mls/hr IV Q6HR CARMITA; Protocol


   Last Admin: 03/15/19 12:36 Dose:  200 mls/hr


   Documented by: 


Vancomycin HCl (Vancomycin/Ns 1 Gm/250 Ml)  1 gm in 250 mls @ 166.667 mls/hr IV 

Q8HR CARMITA; Protocol


   Last Admin: 03/15/19 06:16 Dose:  166.667 mls/hr


   Documented by: 


Ibuprofen (Motrin)  400 mg PO TID PRN


   PRN Reason: Pain , Severe (7-10)


   Last Admin: 03/14/19 21:14 Dose:  400 mg


   Documented by: 


Methadone HCl (Dolophine)  10 mg PO BID [facility]


   Last Admin: 03/15/19 10:46 Dose:  10 mg


   Documented by: 


Nicotine (Habitrol)  21 mg TD QDAY [facility]


   Last Admin: 03/15/19 10:47 Dose:  21 mg


   Documented by: 


Ondansetron HCl (Zofran)  4 mg IV Q8H PRN


   PRN Reason: N/V unrelieved by Reglan











Physical Examination





- Physical Exam


Narrative exam: 





Physical Exam: 


Constitutional: Alert, cooperative. No acute distress


Head, Ears, Nose: Normocephalic, atraumatic. External ears, nose normal


Eyes: Conjunctivae/corneas clear. No icterus. No ptosis.


Neck: Supple, no meningeal signs


Oral: dentition quite poor, no thrush


Cardiovascular: S1, S2 normal. 


Respiratory: Good air entry, clear to auscultation bilaterally


GI: Soft, non-tender; bowel sounds normal. No peritoneal signs


Musculoskeletal: No pedal edema, no cyanosis. Right elbow wound with packing and

dressing


Skin: No rash or abscess. Multiple tattoos 


Hem/Lymphatic: No palpable cervical or supraclavicular nodes. No lymphangitis


Psych: Mood ok. Affect normal


Neurological: Awake, alert, oriented. No gross abnormality





- Constitutional


Vitals: 


                                   Vital Signs











Temp Pulse Resp BP Pulse Ox


 


 98.1 F   58 L  20   96/51   99 


 


 03/15/19 08:31  03/15/19 08:31  03/15/19 08:31  03/15/19 08:31  03/15/19 08:31








                           Temperature -Last 24 Hours











Temperature                    98.1 F


 


Temperature                    98.1 F


 


Temperature                    98.3 F


 


Temperature                    98.3 F

















Results





- Labs


CBC & Chem 7: 


                                 03/14/19 05:55





                                 03/14/19 05:55





Assessment and Plan


Cultures:


03/13/2019 blood culture: No growth at 24 hours





A/P:


28-year-old male with active IV drug use, tobacco dependence with:





1) Active IVDU: recommend drug rehab.





2) Retained broken needle/foreign body in right antecubital fossa s/p removal by

IR on 3/14/2019. CT without abscess or cellulitis. Continue wound care.





3) Hepatitis C: newly diagnosed. Discussed with patient.





4) Chronic chills: no fever. Could be related to withdrawal symptoms. Follow up 

TTE and blood cultures.





5) Poor dentition: recommend outpatient dental eval.





Recs:


Follow-up blood cultures and follow-up TTE, if blood cultures remain negative at

48 hours and TTE without any significant valvular issues, okay to discharge 

patient from ID standpoint tomorrow off abx


Hep C diagnosis was discussed with patient, recommended outpatient eval and 

treatment but needs to abstain from IVDU


check HIV screen (ordered for tomorrow AM)


continue wound care to right antecubital fossa


recommend drug rehab


recommend outpatient dental eval





MD Javier [facility] 


C: [phone]


O: [phone]


F: [phone]

## 2019-03-15 NOTE — PROGRESS NOTE
Assessment and Plan


Assessment and plan: 





Rule out infective endocarditis


- Patient is on IV vancomycin and Zosyn


- blood culture negative for 24 hours, ID wants negative for 48 hours for 

discharge


- follow echocardiogram, was not read


- ID consulted and recommend vancomycin until work up is finished


- Hep C positive, ID ordered HIV test in the morning


Broken needle in the right antecubital area


- IR was consulted and remove the foreign body


Heroin abuse, withdrawal


- On methadone


Nicotine dependence


-Nicotine patch


DVT prophylaxis


- On Lovenox


Disposition


- Possible DC tomorrow.





History


Interval history: 





Patient was seen and evaluated this morning, patient denied chest pain, heroin 

withdrawal was getting better after methadone. patient denied fever, chills.





Hospitalist Physical





- Physical exam


Narrative exam: 


 Not in cardiopulmonary distress. 


 The patient appeared well nourished and normally developed.


 Vital signs as documented.


 Head exam is unremarkable.


 No scleral icterus .


 Neck is without jugular venous distension, thyromegaly, or carotid bruits. 


 Lungs are clear to auscultation.


Cardiac exam reveals regular rate and  Rhythm.  


Abdominal exam reveals normal bowel sounds. 


Extremities are nonedematous and both femoral and pedal pulses are normal.


CNS: Alert and oriented 3.  No focal weakness.





- Constitutional


Vitals: 


                                        











Temp Pulse Resp BP Pulse Ox


 


 98.1 F   58 L  20   96/51   99 


 


 03/15/19 08:31  03/15/19 08:31  03/15/19 08:31  03/15/19 08:31  03/15/19 08:31











General appearance: Present: no acute distress





Results





- Labs


CBC & Chem 7: 


                                 03/14/19 05:55





                                 03/14/19 05:55


Labs: 


                             Laboratory Last Values











WBC  8.0 K/mm3 (4.5-11.0)   03/14/19  05:55    


 


RBC  4.95 M/mm3 (3.65-5.03)   03/14/19  05:55    


 


Hgb  14.4 gm/dl (11.8-15.2)   03/14/19  05:55    


 


Hct  43.3 % (35.5-45.6)   03/14/19  05:55    


 


MCV  87 fl (84-94)   03/14/19  05:55    


 


MCH  29 pg (28-32)   03/14/19  05:55    


 


MCHC  33 % (32-34)   03/14/19  05:55    


 


RDW  15.1 % (13.2-15.2)   03/14/19  05:55    


 


Plt Count  364 K/mm3 (140-440)   03/14/19  05:55    


 


Lymph % (Auto)  30.2 % (13.4-35.0)   03/14/19  05:55    


 


Mono % (Auto)  11.1 % (0.0-7.3)  H  03/14/19  05:55    


 


Eos % (Auto)  1.4 % (0.0-4.3)   03/14/19  05:55    


 


Baso % (Auto)  0.5 % (0.0-1.8)   03/14/19  05:55    


 


Lymph #  2.4 K/mm3 (1.2-5.4)   03/14/19  05:55    


 


Mono #  0.9 K/mm3 (0.0-0.8)  H  03/14/19  05:55    


 


Eos #  0.1 K/mm3 (0.0-0.4)   03/14/19  05:55    


 


Baso #  0.0 K/mm3 (0.0-0.1)   03/14/19  05:55    


 


Seg Neutrophils %  56.8 % (40.0-70.0)   03/14/19  05:55    


 


Seg Neutrophils #  4.6 K/mm3 (1.8-7.7)   03/14/19  05:55    


 


Sodium  141 mmol/L (137-145)   03/14/19  05:55    


 


Potassium  4.3 mmol/L (3.6-5.0)   03/14/19  05:55    


 


Chloride  103.8 mmol/L ()   03/14/19  05:55    


 


Carbon Dioxide  23 mmol/L (22-30)   03/14/19  05:55    


 


Anion Gap  19 mmol/L  03/14/19  05:55    


 


BUN  11 mg/dL (9-20)   03/14/19  05:55    


 


Creatinine  0.7 mg/dL (0.8-1.5)  L  03/14/19  05:55    


 


Estimated GFR  > 60 ml/min  03/14/19  05:55    


 


BUN/Creatinine Ratio  16 %  03/14/19  05:55    


 


Glucose  84 mg/dL ()   03/14/19  05:55    


 


Lactic Acid  1.00 mmol/L (0.7-2.0)   03/13/19  07:56    


 


Calcium  9.2 mg/dL (8.4-10.2)   03/14/19  05:55    


 


Total Bilirubin  0.30 mg/dL (0.1-1.2)   03/13/19  07:56    


 


AST  77 units/L (5-40)  H  03/13/19  07:56    


 


ALT  150 units/L (7-56)  H  03/13/19  07:56    


 


Alkaline Phosphatase  75 units/L ()   03/13/19  07:56    


 


Total Creatine Kinase  160 units/L ()   03/13/19  07:56    


 


CK-MB (CK-2)  1.7 ng/mL (0.0-4.0)   03/13/19  07:56    


 


CK-MB (CK-2) Rel Index  1.0  (0-4)   03/13/19  07:56    


 


Troponin T  < 0.010 ng/mL (0.00-0.029)   03/13/19  07:56    


 


Total Protein  6.7 g/dL (6.3-8.2)   03/13/19  07:56    


 


Albumin  3.9 g/dL (3.9-5)   03/13/19  07:56    


 


Albumin/Globulin Ratio  1.4 %  03/13/19  07:56    


 


Urine Color  Yellow  (Yellow)   03/13/19  08:00    


 


Urine Turbidity  Clear  (Clear)   03/13/19  08:00    


 


Urine pH  7.0  (5.0-7.0)   03/13/19  08:00    


 


Ur Specific Gravity  1.013  (1.003-1.030)   03/13/19  08:00    


 


Urine Protein  <15 mg/dl mg/dL (Negative)   03/13/19  08:00    


 


Urine Glucose (UA)  Neg mg/dL (Negative)   03/13/19  08:00    


 


Urine Ketones  Neg mg/dL (Negative)   03/13/19  08:00    


 


Urine Blood  Neg  (Negative)   03/13/19  08:00    


 


Urine Nitrite  Neg  (Negative)   03/13/19  08:00    


 


Urine Bilirubin  Neg  (Negative)   03/13/19  08:00    


 


Urine Urobilinogen  4.0 mg/dL (<2.0)   03/13/19  08:00    


 


Ur Leukocyte Esterase  Neg  (Negative)   03/13/19  08:00    


 


Urine WBC (Auto)  < 1.0 /HPF (0.0-6.0)   03/13/19  08:00    


 


Urine RBC (Auto)  2.0 /HPF (0.0-6.0)   03/13/19  08:00    


 


Urine Opiates Screen  Presumptive negative   03/13/19  08:00    


 


Urine Methadone Screen  Presumptive negative   03/13/19  08:00    


 


Ur Barbiturates Screen  Presumptive negative   03/13/19  08:00    


 


Ur Phencyclidine Scrn  Presumptive negative   03/13/19  08:00    


 


Ur Amphetamines Screen  Presumptive negative   03/13/19  08:00    


 


U Benzodiazepines Scrn  Presumptive negative   03/13/19  08:00    


 


Urine Cocaine Screen  Presumptive negative   03/13/19  08:00    


 


U Marijuana (THC) Screen  Presumptive positive   03/13/19  08:00    


 


Drugs of Abuse Note  Disclamer   03/13/19  08:00    


 


Hepatitis A IgM Ab  Non-reactive  (NonReactive)   03/13/19  17:35    


 


Hep B Core IgM Ab  Non-reactive  (NonReactive)   03/13/19  17:35    


 


Hepatitis C Antibody  Reactive  (NonReactive)  A  03/13/19  17:35

## 2019-03-15 NOTE — OPERATIVE REPORT
Operative Report


Operative Report: 





EXAM: 


1.  Ultrasound-guided evaluation of right antecubital fossa with image guided 

attempt at removal of foreign body in the subcutaneous tissues of the right arm 


2.  Fluoroscopic guided evaluation of the right antecubital fossa with image 

guided removal of foreign body in the subcutaneous tissues of the right arm.


3.  Complicated removal of foreign body in the subcutaneous tissues of the right

arm.





DATE: 3/14/19





: BRIANA EDGAR MD





INDICATION: IV drug user who broke needle off in the antecubital fossa the right

arm with attempts by emergency room physician to remove the needle but was 

unsuccessful with request for image guided attempt at needle removal.





MEDICATIONS: Please see nursing report for full details.





PROCEDURE:


The risks, benefits, and alternatives were discussed with the patient; written 

informed consent was obtained.





The right arm was prepped and draped in a sterile fashion. Lidocaine with 

epinephrine was used to anesthetize the area. Ultrasound was used to evaluate 

the right arm and the needle was possibly visualized. It was hard to identify 

the needle under ultrasound as it was not very echogenic. Fluoroscopy was used 

which confirmed the needle position.  Evaluating the old incision which was 2 cm

in size, hemostat was placed in the incision and the incision was explored.  

Despite use of ultrasound, the needle could not be removed.





Under fluoroscopic guidance, hemostat was used to probe the wound.  I made a 

second incision near the first incision in an attempt to grasp the needle under 

fluoroscopic guidance but this was not successful.  I then evaluated the 

original wound and then had to push the hemostat through the fascial layer.  I 

was then able to grasp the needle under the fascial layer with the hemostat and 

the needle was successfully removed.  





After this was completed, the area was flushed with saline.  The area was then 

packed with iodoform gauze.  Dressing applied.





Patient tolerated the procedure well. No immediate post procedural complication.





EBL: Minimal





FINDINGS:


Successful removal of the needle within the subcutaneous tissues of the right 

arm under imaging guidance.  Needle was sent for culture.





IMPRESSION: 


1. Successful removal of a needle embedded in the subcutaneous tissues of the 

right arm as described above.

## 2019-03-16 NOTE — DISCHARGE SUMMARY
Providers





- Providers


Date of Admission: 


03/13/19 10:18





Date of discharge: 03/16/19


Attending physician: 


SADIA GARCIA MD





                                        





03/13/19 10:14


Consult to Wound/ET Nurse [CONS] Routine 


   Reason For Exam: wound eval





03/13/19 10:44


Consult to Physician [CONS] Routine 


   Comment: 


   Consulting Provider: BRIANA CAMPO


   Physician Instructions: 


   Reason For Exam: needle in the right antecubital area





03/13/19 17:24


Consult to Physician [CONS] Routine 


   Comment: 


   Consulting Provider: BRANDAN TAVARES


   Physician Instructions: 


   Reason For Exam: IV drug abuse, fever chills ?infective endocarditi





03/14/19 14:42


Consult to Wound/ET Nurse [CONS] Routine 


   Reason For Exam: change iodoform gauze right arm daily














Hospitalization


Reason for admission: foreign body (broken needle) arm, IVDU, r/o endocarditis, 

heroin 


Condition: Stable


Pertinent studies: 





Echo was done; result was pending after 3 days





Procedures: 





Removal of foreign body in the right arm


Hospital course: 





28-year-old -American male with medical history significant for heroin 

abuse, nicotine dependence presented to the emergency department his complaints 

of generalized aching pain for the last 3 days.  He rated the pain 9 out of 10 

intensity, pronounce it in his joints and bone associated with subjective fever 

and chills.  Patient said his needle was broken and left in his right 

antecubital area 3 days ago while he was using heroine.  He cannot move his 

right elbow because of the pain due to the needle.  The patient denied discharge

 from the site.  Patient said he is withdrawing from heroin.  Because of his IV 

drug abuse, fever and chills I want to rule out infective endocarditis.


   Patient was admitted to the floor and he was started with IV antibiotics, ID 

and interventional radiology consulted.  IR took out the broken needle from his 

arm.  Patient is on methadone for heroin withdrawal and withdrawal symptoms 

subsided.  Blood culture was negative for the last 24 hours and ID recommended 

discharge if negative after 48 hours.  Patient's transaminases were elevated and

 hepatitis panel was done was positive for hep C antibody, ID discussed with him

 for follow-up on an outpatient for the treatment of hepatitis c.  Patient was 

supposed to have screening for HIV and this morning but patient left AMA last ni

ght.


Disposition: [id number] LEFT AGAINST MED ADVICE


Time spent for discharge: 32 minutes





- Discharge Diagnoses


(1) Body aches


Status: Acute   





(2) Chest pain


Status: Acute   


Qualifiers: 


   Chest pain type: unspecified   Qualified Code(s): R07.9 - Chest pain, 

unspecified   





(3) Chills


Status: Acute   





(4) Foreign body of upper arm, superficial


Status: Acute   


Qualifiers: 


   Encounter type: initial encounter   Laterality: right   Qualified Code(s): 

S40.851A - Superficial foreign body of right upper arm, initial encounter   





(5) SOB (shortness of breath)


Status: Acute   





Core Measure Documentation





- Palliative Care


Palliative Care/ Comfort Measures: Not Applicable





- Core Measures


Any of the following diagnoses?: none





Exam





- Physical Exam


Narrative exam: 


 Didn't examined the patient by the time he left.





- Constitutional


Vitals: 


                                        











Temp Pulse Resp BP Pulse Ox


 


 98.1 F   52 L  20   102/53   99 


 


 03/15/19 17:25  03/15/19 17:25  03/15/19 17:25  03/15/19 17:25  03/15/19 17:25














Plan


Activity: no restrictions


Weight Bearing Status: Full Weight Bearing


Diet: regular


Follow up with: 


[facility] [Other] - 3-5 Days